# Patient Record
(demographics unavailable — no encounter records)

---

## 2024-11-30 NOTE — HISTORY OF PRESENT ILLNESS
[de-identified] : PULLING ON BOTH EARS AND FEVER. [FreeTextEntry6] : Tmax = 103.5F coughing, minimal cold infant sib w/impetigo: no rash in pt currently

## 2024-11-30 NOTE — HISTORY OF PRESENT ILLNESS
[de-identified] : PULLING ON BOTH EARS AND FEVER. [FreeTextEntry6] : Tmax = 103.5F coughing, minimal cold infant sib w/impetigo: no rash in pt currently

## 2024-11-30 NOTE — PLAN
[TextEntry] : rule out mycoplasma / RSV during endemic supportive: rest, analgesics, fluids o/w benadryl / honey prn ok saline / suction, elevated head, humidifier, vicks to ff closely

## 2024-12-03 NOTE — DISCUSSION/SUMMARY
[FreeTextEntry1] : 22m M w/ presentation consistent with HFMD/Coxsackie Virus.   Plan: 1. Supportive care discussed including encouraging fluids and monitoring hydration 2. Apply Mupirocin tid to lesions of face with crusting 3. Monitor and return with any new or worsening symptoms.

## 2024-12-03 NOTE — HISTORY OF PRESENT ILLNESS
[de-identified] : RASH ALL OVER BODY. POSITIVE FOR ENTERO/RHINOVIRUS. [FreeTextEntry6] : 22m M present with rash to face since yesterday. Pt with fever noted 2 days ago. Child tested positive for entero/rhinovirus.

## 2024-12-03 NOTE — PHYSICAL EXAM
[NL] : warm, clear [de-identified] : papular rash to hands and thighs. perioral rash with crusting noted.

## 2024-12-03 NOTE — HISTORY OF PRESENT ILLNESS
[de-identified] : RASH ALL OVER BODY. POSITIVE FOR ENTERO/RHINOVIRUS. [FreeTextEntry6] : 22m M present with rash to face since yesterday. Pt with fever noted 2 days ago. Child tested positive for entero/rhinovirus.

## 2025-02-14 NOTE — PHYSICAL EXAM
no [Alert] : alert [No Acute Distress] : no acute distress [Normocephalic] : normocephalic [Anterior [place] Closed] : anterior fontanelle closed [Red Reflex Bilateral] : red reflex bilateral [PERRL] : PERRL [Normally Placed Ears] : normally placed ears [Auricles Well Formed] : auricles well formed [Clear Tympanic membranes with present light reflex and bony landmarks] : clear tympanic membranes with present light reflex and bony landmarks [No Discharge] : no discharge [Nares Patent] : nares patent [Palate Intact] : palate intact [Uvula Midline] : uvula midline [Tooth Eruption] : tooth eruption  [Supple, full passive range of motion] : supple, full passive range of motion [No Palpable Masses] : no palpable masses [Symmetric Chest Rise] : symmetric chest rise [Clear to Auscultation Bilaterally] : clear to auscultation bilaterally [Regular Rate and Rhythm] : regular rate and rhythm [S1, S2 present] : S1, S2 present [No Murmurs] : no murmurs [+2 Femoral Pulses] : +2 femoral pulses [Soft] : soft [NonTender] : non tender [Non Distended] : non distended [Normoactive Bowel Sounds] : normoactive bowel sounds [No Hepatomegaly] : no hepatomegaly [No Splenomegaly] : no splenomegaly [Central Urethral Opening] : central urethral opening [Testicles Descended Bilaterally] : testicles descended bilaterally [Patent] : patent [Normally Placed] : normally placed [No Abnormal Lymph Nodes Palpated] : no abnormal lymph nodes palpated [No Clavicular Crepitus] : no clavicular crepitus [Symmetric Buttocks Creases] : symmetric buttocks creases [No Spinal Dimple] : no spinal dimple [NoTuft of Hair] : no tuft of hair [Cranial Nerves Grossly Intact] : cranial nerves grossly intact [No Rash or Lesions] : no rash or lesions

## 2025-05-23 NOTE — PLAN
[TextEntry] : SUPPORTIVE CARE HOLD AMOX, MAY OBSERVE FOR 48-72HR AND START IF NO IMPROVEMENT IN SYMPTOMS OR NEW FEVER/WORSENING SYMPTOMS PRIOR IF STARTS AMOX AND NO IMPROVEMENT AFTER 2-3 DAYS, FOLLOW UP

## 2025-05-23 NOTE — PHYSICAL EXAM
[Clear] : left tympanic membrane clear [Mucoid Discharge] : mucoid discharge [NL] : warm, clear [FreeTextEntry3] : RIGHT TM OBSCURED BY CERUMEN, UNABLE TO REMOVE WITH CURETTE [FreeTextEntry4] : CONGESTED